# Patient Record
Sex: MALE | Race: WHITE | NOT HISPANIC OR LATINO | Employment: UNEMPLOYED | URBAN - METROPOLITAN AREA
[De-identification: names, ages, dates, MRNs, and addresses within clinical notes are randomized per-mention and may not be internally consistent; named-entity substitution may affect disease eponyms.]

---

## 2020-04-22 ENCOUNTER — HOSPITAL ENCOUNTER (EMERGENCY)
Dept: CARDIOLOGY | Facility: CLINIC | Age: 45
End: 2020-04-22
Attending: NURSE PRACTITIONER
Payer: COMMERCIAL

## 2020-04-22 ENCOUNTER — APPOINTMENT (OUTPATIENT)
Dept: GENERAL RADIOLOGY | Facility: CLINIC | Age: 45
End: 2020-04-22
Attending: NURSE PRACTITIONER
Payer: COMMERCIAL

## 2020-04-22 ENCOUNTER — HOSPITAL ENCOUNTER (EMERGENCY)
Facility: CLINIC | Age: 45
Discharge: HOME | End: 2020-04-22
Attending: NURSE PRACTITIONER | Admitting: NURSE PRACTITIONER
Payer: COMMERCIAL

## 2020-04-22 VITALS
WEIGHT: 150 LBS | HEART RATE: 68 BPM | BODY MASS INDEX: 20.32 KG/M2 | DIASTOLIC BLOOD PRESSURE: 80 MMHG | SYSTOLIC BLOOD PRESSURE: 150 MMHG | HEIGHT: 72 IN | OXYGEN SATURATION: 99 % | RESPIRATION RATE: 16 BRPM | TEMPERATURE: 98.2 F

## 2020-04-22 DIAGNOSIS — R00.2 PALPITATIONS: ICD-10-CM

## 2020-04-22 LAB
ALBUMIN SERPL-MCNC: 4.2 G/DL (ref 3.4–5)
ALP SERPL-CCNC: 79 U/L (ref 40–150)
ALT SERPL W P-5'-P-CCNC: 32 U/L (ref 0–70)
ANION GAP SERPL CALCULATED.3IONS-SCNC: 7 MMOL/L (ref 3–14)
AST SERPL W P-5'-P-CCNC: 22 U/L (ref 0–45)
BASOPHILS # BLD AUTO: 0 10E9/L (ref 0–0.2)
BASOPHILS NFR BLD AUTO: 0.8 %
BILIRUB SERPL-MCNC: 0.8 MG/DL (ref 0.2–1.3)
BUN SERPL-MCNC: 15 MG/DL (ref 7–30)
CALCIUM SERPL-MCNC: 9.2 MG/DL (ref 8.5–10.1)
CHLORIDE SERPL-SCNC: 106 MMOL/L (ref 94–109)
CO2 SERPL-SCNC: 26 MMOL/L (ref 20–32)
CREAT SERPL-MCNC: 0.99 MG/DL (ref 0.66–1.25)
DIFFERENTIAL METHOD BLD: NORMAL
EOSINOPHIL # BLD AUTO: 0.1 10E9/L (ref 0–0.7)
EOSINOPHIL NFR BLD AUTO: 1.4 %
ERYTHROCYTE [DISTWIDTH] IN BLOOD BY AUTOMATED COUNT: 12.9 % (ref 10–15)
GFR SERPL CREATININE-BSD FRML MDRD: >90 ML/MIN/{1.73_M2}
GLUCOSE SERPL-MCNC: 100 MG/DL (ref 70–99)
HCT VFR BLD AUTO: 47.1 % (ref 40–53)
HGB BLD-MCNC: 16.4 G/DL (ref 13.3–17.7)
IMM GRANULOCYTES # BLD: 0 10E9/L (ref 0–0.4)
IMM GRANULOCYTES NFR BLD: 0.2 %
INTERPRETATION ECG - MUSE: NORMAL
LIPASE SERPL-CCNC: 112 U/L (ref 73–393)
LYMPHOCYTES # BLD AUTO: 1.8 10E9/L (ref 0.8–5.3)
LYMPHOCYTES NFR BLD AUTO: 36 %
MAGNESIUM SERPL-MCNC: 2.2 MG/DL (ref 1.6–2.3)
MCH RBC QN AUTO: 32.4 PG (ref 26.5–33)
MCHC RBC AUTO-ENTMCNC: 34.8 G/DL (ref 31.5–36.5)
MCV RBC AUTO: 93 FL (ref 78–100)
MONOCYTES # BLD AUTO: 0.5 10E9/L (ref 0–1.3)
MONOCYTES NFR BLD AUTO: 10.8 %
NEUTROPHILS # BLD AUTO: 2.5 10E9/L (ref 1.6–8.3)
NEUTROPHILS NFR BLD AUTO: 50.8 %
NRBC # BLD AUTO: 0 10*3/UL
NRBC BLD AUTO-RTO: 0 /100
PLATELET # BLD AUTO: 275 10E9/L (ref 150–450)
POTASSIUM SERPL-SCNC: 3.3 MMOL/L (ref 3.4–5.3)
PROT SERPL-MCNC: 7.3 G/DL (ref 6.8–8.8)
RBC # BLD AUTO: 5.06 10E12/L (ref 4.4–5.9)
SODIUM SERPL-SCNC: 139 MMOL/L (ref 133–144)
TROPONIN I SERPL-MCNC: <0.015 UG/L (ref 0–0.04)
WBC # BLD AUTO: 4.9 10E9/L (ref 4–11)

## 2020-04-22 PROCEDURE — 93005 ELECTROCARDIOGRAM TRACING: CPT | Mod: 59

## 2020-04-22 PROCEDURE — 99285 EMERGENCY DEPT VISIT HI MDM: CPT | Mod: 25

## 2020-04-22 PROCEDURE — 71046 X-RAY EXAM CHEST 2 VIEWS: CPT

## 2020-04-22 PROCEDURE — 0296T ZIO PATCH HOLTER ADULT PEDIATRIC GREATER THAN 48 HRS: CPT

## 2020-04-22 PROCEDURE — 84484 ASSAY OF TROPONIN QUANT: CPT | Performed by: NURSE PRACTITIONER

## 2020-04-22 PROCEDURE — 25000132 ZZH RX MED GY IP 250 OP 250 PS 637: Performed by: NURSE PRACTITIONER

## 2020-04-22 PROCEDURE — 83735 ASSAY OF MAGNESIUM: CPT | Performed by: NURSE PRACTITIONER

## 2020-04-22 PROCEDURE — 0298T ZIO PATCH HOLTER ADULT PEDIATRIC GREATER THAN 48 HRS: CPT | Performed by: INTERNAL MEDICINE

## 2020-04-22 PROCEDURE — 83690 ASSAY OF LIPASE: CPT | Performed by: NURSE PRACTITIONER

## 2020-04-22 PROCEDURE — 80053 COMPREHEN METABOLIC PANEL: CPT | Performed by: NURSE PRACTITIONER

## 2020-04-22 PROCEDURE — 85025 COMPLETE CBC W/AUTO DIFF WBC: CPT | Performed by: NURSE PRACTITIONER

## 2020-04-22 RX ORDER — ASPIRIN 81 MG/1
324 TABLET, CHEWABLE ORAL ONCE
Status: COMPLETED | OUTPATIENT
Start: 2020-04-22 | End: 2020-04-22

## 2020-04-22 RX ADMIN — ASPIRIN 81 MG 324 MG: 81 TABLET ORAL at 11:35

## 2020-04-22 ASSESSMENT — ENCOUNTER SYMPTOMS
FEVER: 0
SHORTNESS OF BREATH: 1
PALPITATIONS: 1
APPETITE CHANGE: 0
ACTIVITY CHANGE: 0
COUGH: 0

## 2020-04-22 ASSESSMENT — MIFFLIN-ST. JEOR: SCORE: 1608.4

## 2020-04-22 NOTE — ED AVS SNAPSHOT
Emergency Department  [address] [place] [address]  Phone:  [phone]  Fax:  [phone]                                    Alejo Ramirez   MRN: [id number]    Department:   Emergency Department   Date of Visit:  4/22/2020           After Visit Summary Signature Page    I have received my discharge instructions, and my questions have been answered. I have discussed any challenges I see with this plan with the nurse or doctor.    ..........................................................................................................................................  Patient/Patient Representative Signature      ..........................................................................................................................................  Patient Representative Print Name and Relationship to Patient    ..................................................               ................................................  Date                                   Time    ..........................................................................................................................................  Reviewed by Signature/Title    ...................................................              ..............................................  Date                                               Time          22EPIC Rev 08/18

## 2020-04-22 NOTE — ED PROVIDER NOTES
History     Chief Complaint:  Palpitations    HPI  Alejo Ramirez is a 45 year old male who presents to the emergency department for evaluation of palpitations. The patient reports once an hour for the past 5 days having episodes where he feels his heart will skip a beat. The first episode today was at 0700 and he had a single instance of feeling short of breath when this happened but this has not reoccurred. In the past the patient has experienced this beat skipping feeling rarely but it has been consistently once an hour for these last few days. He denies any changes in his diet, exercise or medications prior to this. He denies any fever, cough, constant shortness of breath, or chest pain.     Allergies:  No known drug allergies    Medications:    The patient is not currently taking any prescribed medications.    Past Medical History:    The patient does not have any past pertinent medical history.    Past Surgical History:    History reviewed. No pertinent surgical history.    Family History:    History reviewed. No pertinent family history.     Social History:  The patient arrives alone  Smoking: never  Alcohol use: yes    Review of Systems   Constitutional: Negative for activity change, appetite change and fever.   Respiratory: Positive for shortness of breath. Negative for cough.    Cardiovascular: Positive for palpitations. Negative for chest pain.   All other systems reviewed and are negative.    Physical Exam     Patient Vitals for the past 24 hrs:   BP Temp Temp src Pulse Heart Rate Resp SpO2 Height Weight   04/22/20 1215 (!) 150/80 -- -- -- -- 16 99 % -- --   04/22/20 1136 (!) 148/78 -- -- -- 56 9 98 % -- --   04/22/20 1130 -- -- -- -- 54 11 99 % -- --   04/22/20 1111 (!) 152/82 98.2  F (36.8  C) Oral 68 -- 18 97 % 1.829 m (6') 68 kg (150 lb)     Physical Exam  Nursing notes reviewed. Vitals reviewed.  General: Alert. Well kept.  Eyes:  Conjunctiva non-injected, non-icteric.  Neck/Throat: Moist mucous  membranes.  Normal voice.  Cardiac: Regular rhythm. Normal heart sounds with no murmur/rubs/click. No peripheral edema.   Pulmonary: Clear and equal breath sounds bilaterally. No crackles/rales. No wheezing  Abdomen: Soft. Non-distended. Non-tender to palpation. No masses. No guarding or rebound.  Musculoskeletal: Normal gross range of motion of all 4 extremities.    Neurological: Alert and oriented x4.   Skin: Warm and dry without rashes or petechiae. Normal appearance of visualized exposed skin.  Psych: Affect normal. Good eye contact.    Emergency Department Course     ECG:  ECG taken at 1114, ECG read at 1121  Sinus bradycardia  Otherwise normal ECG  Rate 59 bpm. HI interval 132 ms. QRS duration 92 ms. QT/QTc 422/417 ms. P-R-T axes 75 81 71.    Imaging:  Radiology findings were communicated with the patient who voiced understanding of the findings.    XR Chest 2 views   IMPRESSION: Negative chest  Reading per radiology    Laboratory:  Laboratory findings were communicated with the patient who voiced understanding of the findings.    CBC:  o/w WNL. (WBC 4.9, HGB 16.4, )   CMP: Glucose 100 (H), potassium 3.3 (L), o/w WNL (Creatinine: 0.99)    Lipase: 112    Troponin (Collected 1132): <0.015    Magnesium: 2.2    Interventions:  1135 Aspirin 325mg PO    Emergency Department Course:  Past medical records, nursing notes, and vitals reviewed.  1121: I performed an exam of the patient and obtained history, as documented above.     IV was inserted and blood was drawn for laboratory testing, results above.  The patient was sent for an XR while in the emergency department, findings above    1201: I rechecked the patient. Explained findings to patient.    I personally reviewed the laboratory and imaging results with the Patient and answered all related questions prior to discharge.     Findings and plan explained to the Patient. Patient discharged home with instructions regarding supportive care, medications, and  reasons to return. The importance of close follow-up was reviewed.   Impression & Plan      Medical Decision Making:  Alejo Ramirez is a 45 year old male who presents for evaluation of palpitations.  Initial ECG shows sinus bradycardia. The work up in the Emergency Department is negative , monitoring and EKG have not shown any abnormal heart rhythms or concerning morphologies.  I considered a broad differential diagnosis including acute coronary syndrome, myocardial infarction, pulmonary embolism, electrolyte abnormalities, drug reaction, thyroid disease, caffeine or other stimulant, anxiety, amongst others.  Electrolytes are normal and the patient is not anemic. No EKG changes or troponin elevation concerning for acute coronary syndrome.  The patient is PERC negative and PE considered unlikely. A ZioPatch monitor was ordered and he will follow-up with his primary care to discuss results of the Ziopatch.   No serious etiology for the palpitations were detected today during this visit and I feel the patient is safe for discharge.   Close follow up with primary care is indicated, this was made clear to the patient, who understands.     Diagnosis:    ICD-10-CM    1. Palpitations  R00.2 Zio Patch Holter Adult Pediatric Greater than 48 hrs       Disposition:   discharged to home      Scribe Disclosure:  Jessica CASTRO, am serving as a scribe at 11:21 AM on 4/22/2020 to document services personally performed by Nohelia Castorena DNP based on my observations and the provider's statements to me.     EMERGENCY DEPARTMENT       Nohelia Castorena CNP  04/22/20 1192

## 2024-05-16 ENCOUNTER — APPOINTMENT (OUTPATIENT)
Dept: GENERAL RADIOLOGY | Facility: CLINIC | Age: 49
End: 2024-05-16
Attending: EMERGENCY MEDICINE
Payer: COMMERCIAL

## 2024-05-16 ENCOUNTER — HOSPITAL ENCOUNTER (EMERGENCY)
Facility: CLINIC | Age: 49
Discharge: HOME | End: 2024-05-16
Attending: EMERGENCY MEDICINE | Admitting: EMERGENCY MEDICINE
Payer: COMMERCIAL

## 2024-05-16 VITALS
BODY MASS INDEX: 21.4 KG/M2 | OXYGEN SATURATION: 98 % | TEMPERATURE: 97.7 F | HEART RATE: 54 BPM | DIASTOLIC BLOOD PRESSURE: 82 MMHG | SYSTOLIC BLOOD PRESSURE: 129 MMHG | RESPIRATION RATE: 8 BRPM | HEIGHT: 72 IN | WEIGHT: 158 LBS

## 2024-05-16 DIAGNOSIS — R07.9 CHEST PAIN, UNSPECIFIED TYPE: ICD-10-CM

## 2024-05-16 DIAGNOSIS — R91.1 PULMONARY NODULE: ICD-10-CM

## 2024-05-16 LAB
ANION GAP SERPL CALCULATED.3IONS-SCNC: 11 MMOL/L (ref 7–15)
ATRIAL RATE - MUSE: 55 BPM
BASOPHILS # BLD AUTO: 0.1 10E3/UL (ref 0–0.2)
BASOPHILS NFR BLD AUTO: 1 %
BUN SERPL-MCNC: 15.2 MG/DL (ref 6–20)
CALCIUM SERPL-MCNC: 8.6 MG/DL (ref 8.6–10)
CHLORIDE SERPL-SCNC: 104 MMOL/L (ref 98–107)
CREAT SERPL-MCNC: 1.02 MG/DL (ref 0.67–1.17)
DEPRECATED HCO3 PLAS-SCNC: 26 MMOL/L (ref 22–29)
DIASTOLIC BLOOD PRESSURE - MUSE: NORMAL MMHG
EGFRCR SERPLBLD CKD-EPI 2021: >90 ML/MIN/1.73M2
EOSINOPHIL # BLD AUTO: 0.1 10E3/UL (ref 0–0.7)
EOSINOPHIL NFR BLD AUTO: 1 %
ERYTHROCYTE [DISTWIDTH] IN BLOOD BY AUTOMATED COUNT: 12.9 % (ref 10–15)
GLUCOSE SERPL-MCNC: 126 MG/DL (ref 70–99)
HCT VFR BLD AUTO: 45.5 % (ref 40–53)
HGB BLD-MCNC: 15.7 G/DL (ref 13.3–17.7)
HOLD SPECIMEN: NORMAL
HOLD SPECIMEN: NORMAL
IMM GRANULOCYTES # BLD: 0 10E3/UL
IMM GRANULOCYTES NFR BLD: 0 %
INTERPRETATION ECG - MUSE: NORMAL
LYMPHOCYTES # BLD AUTO: 1.7 10E3/UL (ref 0.8–5.3)
LYMPHOCYTES NFR BLD AUTO: 27 %
MCH RBC QN AUTO: 33 PG (ref 26.5–33)
MCHC RBC AUTO-ENTMCNC: 34.5 G/DL (ref 31.5–36.5)
MCV RBC AUTO: 96 FL (ref 78–100)
MONOCYTES # BLD AUTO: 0.6 10E3/UL (ref 0–1.3)
MONOCYTES NFR BLD AUTO: 9 %
NEUTROPHILS # BLD AUTO: 3.9 10E3/UL (ref 1.6–8.3)
NEUTROPHILS NFR BLD AUTO: 62 %
NRBC # BLD AUTO: 0 10E3/UL
NRBC BLD AUTO-RTO: 0 /100
P AXIS - MUSE: 75 DEGREES
PLATELET # BLD AUTO: 290 10E3/UL (ref 150–450)
POTASSIUM SERPL-SCNC: 3.7 MMOL/L (ref 3.4–5.3)
PR INTERVAL - MUSE: 142 MS
QRS DURATION - MUSE: 94 MS
QT - MUSE: 418 MS
QTC - MUSE: 399 MS
R AXIS - MUSE: 75 DEGREES
RBC # BLD AUTO: 4.76 10E6/UL (ref 4.4–5.9)
SODIUM SERPL-SCNC: 141 MMOL/L (ref 135–145)
SYSTOLIC BLOOD PRESSURE - MUSE: NORMAL MMHG
T AXIS - MUSE: 73 DEGREES
TROPONIN T SERPL HS-MCNC: <6 NG/L
TROPONIN T SERPL HS-MCNC: <6 NG/L
VENTRICULAR RATE- MUSE: 55 BPM
WBC # BLD AUTO: 6.3 10E3/UL (ref 4–11)

## 2024-05-16 PROCEDURE — 84484 ASSAY OF TROPONIN QUANT: CPT | Performed by: EMERGENCY MEDICINE

## 2024-05-16 PROCEDURE — 71046 X-RAY EXAM CHEST 2 VIEWS: CPT

## 2024-05-16 PROCEDURE — 36415 COLL VENOUS BLD VENIPUNCTURE: CPT | Performed by: STUDENT IN AN ORGANIZED HEALTH CARE EDUCATION/TRAINING PROGRAM

## 2024-05-16 PROCEDURE — 85004 AUTOMATED DIFF WBC COUNT: CPT | Performed by: STUDENT IN AN ORGANIZED HEALTH CARE EDUCATION/TRAINING PROGRAM

## 2024-05-16 PROCEDURE — 80048 BASIC METABOLIC PNL TOTAL CA: CPT | Performed by: EMERGENCY MEDICINE

## 2024-05-16 PROCEDURE — 85025 COMPLETE CBC W/AUTO DIFF WBC: CPT | Performed by: EMERGENCY MEDICINE

## 2024-05-16 PROCEDURE — 93005 ELECTROCARDIOGRAM TRACING: CPT

## 2024-05-16 PROCEDURE — 99285 EMERGENCY DEPT VISIT HI MDM: CPT | Mod: 25

## 2024-05-16 PROCEDURE — 36415 COLL VENOUS BLD VENIPUNCTURE: CPT | Performed by: EMERGENCY MEDICINE

## 2024-05-16 ASSESSMENT — ACTIVITIES OF DAILY LIVING (ADL)
ADLS_ACUITY_SCORE: 35

## 2024-05-16 ASSESSMENT — COLUMBIA-SUICIDE SEVERITY RATING SCALE - C-SSRS
6. HAVE YOU EVER DONE ANYTHING, STARTED TO DO ANYTHING, OR PREPARED TO DO ANYTHING TO END YOUR LIFE?: NO
1. IN THE PAST MONTH, HAVE YOU WISHED YOU WERE DEAD OR WISHED YOU COULD GO TO SLEEP AND NOT WAKE UP?: NO
2. HAVE YOU ACTUALLY HAD ANY THOUGHTS OF KILLING YOURSELF IN THE PAST MONTH?: NO

## 2024-05-17 NOTE — ED NOTES
Discharge to home, review of discharge paperwork, Primary follow-up and all medications have been competed.  Patient and/or family questions gave been answered.

## 2024-05-17 NOTE — ED TRIAGE NOTES
Patient has been having chest discomfort for a couple of weeks, and has been seen and cleared at [facility] last week.  Today he had an episode of increase sharp pain on the left anterior chest that has since resolved.  Denies sob.     Triage Assessment (Adult)       Row Name 05/16/24 1912          Triage Assessment    Airway WDL WDL        Respiratory WDL    Respiratory WDL WDL        Skin Circulation/Temperature WDL    Skin Circulation/Temperature WDL WDL        Cardiac WDL    Cardiac WDL X;chest pain        Peripheral/Neurovascular WDL    Peripheral Neurovascular WDL WDL        Cognitive/Neuro/Behavioral WDL    Cognitive/Neuro/Behavioral WDL WDL

## 2024-05-17 NOTE — ED PROVIDER NOTES
"  Emergency Department Note      History of Present Illness     Chief Complaint  Chest Pain    HPI  Alejo Ramirez is a 49 year old male who presents to the ED for evaluation of chest pain. The patient reports that he was sitting his chair watching television tonight when his midsternum chest felt like he was \"electrically jolted\" for a two second long episode and developed lightheadedness afterward. The patient notes ongoing episodes of midsternum chest pain that last for hours and typically onset when he is at rest. He endorses being active 3-4 times a week with no issue and actually states his symptoms improve when he is exerting himself. Notably, the patient saw a primary care doctor 05/09/2024 regarding this ongoing chest pain and upper left quadrant abdominal pain. The patient denotes any fever, cough, shortness of breath, chest pain, or palpitations.The patient endorses no known heart disease in himself or relatives, but does not that both his mother and brother have hisotries of heart murmurs.          HEART Score  Background  Calculates the overall risk of adverse event in patient's presenting with chest pain.  Based on 5 criteria (each assigned 0-2 points) including suspiciousness of history, EKG, age, risk factors and troponin.    Data  49 year old male  does not have a problem list on file.   reports that he has never smoked. He does not have any smokeless tobacco history on file.  family history is not on file.  Lab Results   Component Value Date    TROPI <0.015 04/22/2020     Criteria   0-2 points for each of 5 items (maximum of 10 points):  Score 0- History slightly suspicious for coronary syndrome  Score 0- EKG Normal  Score 1- Age 45 to 65 years old  Score 0- No risk factors for atherosclerotic disease  Score 0- Within normal limits for troponin levels  Interpretation  Risk of adverse outcome  Heart Score: 1  Total Score 0-3- Adverse Outcome Risk 2.5% - Supports early discharge with appropriate " follow-up    Independent Historian  None    Review of External Notes  I reviewed the office visit on 05/09/2024 with Dr. Carlton for chest pain. Her report noted a normal TSH, elevated cholesterol, normal metabolic panel, unremarkable EKG, and chest pain due to GI symptoms so he was started on prilosec.         Past Medical History   Medical History and Problem List  Migraine    Medications  Prilosec     Surgical History   No pertinent surgical history.     Physical Exam   Patient Vitals for the past 24 hrs:   BP Temp Temp src Pulse Resp SpO2 Height Weight   05/16/24 2215 129/82 -- -- 54 (!) 8 98 % -- --   05/16/24 2200 125/85 -- -- 61 11 97 % -- --   05/16/24 2145 124/84 -- -- 63 -- 98 % -- --   05/16/24 2100 130/85 -- -- 60 13 96 % -- --   05/16/24 2045 127/89 -- -- 57 (!) 7 97 % -- --   05/16/24 2030 128/82 -- -- 60 10 96 % -- --   05/16/24 2015 130/81 -- -- 58 12 96 % -- --   05/16/24 2000 (!) 151/91 -- -- 68 (!) 31 99 % -- --   05/16/24 1916 (!) 147/84 -- -- -- -- -- -- --   05/16/24 1911 -- 97.7  F (36.5  C) Temporal 63 20 99 % 1.829 m (6') 71.7 kg (158 lb)     Physical Exam    Physical Exam   Constitutional:  Patient is oriented to person, place, and time. They appear well-developed and well-nourished.     HENT:   Mouth/Throat:   Oropharynx is clear and moist.   Eyes:    Conjunctivae normal and EOM are normal. Pupils are equal, round, and reactive to light.   Neck:    Normal range of motion.   Cardiovascular: Normal rate, regular rhythm and normal heart sounds.  Exam reveals no gallop and no friction rub.  No murmur heard.  Pulmonary/Chest:  Effort normal and breath sounds normal. Patient has no wheezes. Patient has no rales.   Abdominal:   Soft. Bowel sounds are normal. Patient exhibits no mass. There is no tenderness. There is no rebound and no guarding.   Musculoskeletal:  Normal range of motion. Patient exhibits no edema.   Neurological:   Patient is alert and oriented to person, place, and time. Patient  has normal strength. No cranial nerve deficit or sensory deficit. GCS 15  Skin:   Skin is warm and dry. No rash noted. No erythema.   Psychiatric:   Patient has a normal mood and affect. Patient's behavior is normal. Judgment and thought content normal.    Diagnostics   Lab Results   Labs Ordered and Resulted from Time of ED Arrival to Time of ED Departure   BASIC METABOLIC PANEL - Abnormal       Result Value    Sodium 141      Potassium 3.7      Chloride 104      Carbon Dioxide (CO2) 26      Anion Gap 11      Urea Nitrogen 15.2      Creatinine 1.02      GFR Estimate >90      Calcium 8.6      Glucose 126 (*)    TROPONIN T, HIGH SENSITIVITY - Normal    Troponin T, High Sensitivity <6     CBC WITH PLATELETS AND DIFFERENTIAL    WBC Count 6.3      RBC Count 4.76      Hemoglobin 15.7      Hematocrit 45.5      MCV 96      MCH 33.0      MCHC 34.5      RDW 12.9      Platelet Count 290      % Neutrophils 62      % Lymphocytes 27      % Monocytes 9      % Eosinophils 1      % Basophils 1      % Immature Granulocytes 0      NRBCs per 100 WBC 0      Absolute Neutrophils 3.9      Absolute Lymphocytes 1.7      Absolute Monocytes 0.6      Absolute Eosinophils 0.1      Absolute Basophils 0.1      Absolute Immature Granulocytes 0.0      Absolute NRBCs 0.0     TROPONIN T, HIGH SENSITIVITY       Imaging  XR Chest 2 Views   Final Result   IMPRESSION: Cardiac silhouette size is within normal limits. New, 5 mm nodular opacity at the lateral aspect of the left midlung. This could be inflammatory in nature. Recommend follow-up chest x-ray in 4-6 weeks 2 reassess this. Lungs are otherwise    clear.      Exercise Stress Echocardiogram    (Results Pending)       EKG   ECG taken at 1917, ECG read at 1921  Sinus Bradycardia    No change as compared to prior, dated 04/22/2020.  Rate 55 bpm. ND interval 142 ms. QRS duration 94 ms. QT/QTc 418/399 ms. P-R-T axes 75 75 73.    Independent Interpretation  None  ED Course    Medications  Administered  Medications - No data to display    Procedures  Procedures     Discussion of Management  None    Social Determinants of Health adding to complexity of care  None    ED Course  ED Course as of 05/16/24 2233   Thu May 16, 2024   2031 I obtained history and examined the patient as noted above.    2215 I rechecked and updated the patient. We discussed plans for discharge and the patient is comfortable with this plan.      Medical Decision Making / Diagnosis   CMS Diagnoses: None    MIPS     None    [facility]  Alejo Ramirez is a 49 year old male who presents to the emergency department with nausea very atypical chest pain.  EKG shows sinus rhythm with no ischemia.  Cardiac enzyme is undetectable.  He is not acutely anemic he has no metabolic derangement.  Chest x-ray was performed which does show a nodule on the left lung which he was made aware of he should get this rechecked in 4 to 6 weeks through his primary care doctor no real concern for pneumonia or infection.  At this time I think given his low risk for cardiac disease the atypical nature of this pain that this is unlikely to be cardiac.  I will refer him to cardiology he had a Zio patch done a couple years ago that maybe showed 4 beats of V. tach but never followed up.  A stress echo will be ordered and he will follow-up with his primary care doctor afterwards.  In the meantime he will refrain from doing his home workouts until he gets his results from his stress test.    Disposition  The patient was discharged.     ICD-10 Codes:    ICD-10-CM    1. Chest pain, unspecified type  R07.9 Follow-Up with Cardiology     Exercise Stress Echocardiogram      2. Pulmonary nodule  R91.1            Discharge Medications  New Prescriptions    No medications on file         Scribe Disclosure:  I, Dorita Randhawa, am serving as a scribe at 9:02 PM on 5/16/2024 to document services personally performed by Azul Carrillo MD based on my observations and the  provider's statements to me.     Scribe Disclosure:  I, Shay Fartun, am serving as a scribe at 9:17 PM on 5/16/2024 to document services personally performed by Azul Carrillo MD based on my observations and the provider's statements to me.        Azul Carrillo MD  05/17/24 0036

## 2024-05-17 NOTE — DISCHARGE INSTRUCTIONS
Follow-up with cardiology in 1 week for a stress test.  Refrain from doing your home workouts and to receive results.  There is a pulmonary nodule on your chest x-ray it is recommended you get a repeat chest x-ray in 4 to 6 weeks to ensure resolution this may be inflammatory.  Return to the emergency department if you have any new, worsening, or persistent chest pain.

## 2024-05-23 ENCOUNTER — HOSPITAL ENCOUNTER (OUTPATIENT)
Dept: CARDIOLOGY | Facility: CLINIC | Age: 49
Discharge: HOME | End: 2024-05-23
Attending: EMERGENCY MEDICINE | Admitting: EMERGENCY MEDICINE
Payer: COMMERCIAL

## 2024-05-23 DIAGNOSIS — R07.9 CHEST PAIN, UNSPECIFIED TYPE: ICD-10-CM

## 2024-05-23 PROCEDURE — 93350 STRESS TTE ONLY: CPT | Mod: 26 | Performed by: INTERNAL MEDICINE

## 2024-05-23 PROCEDURE — 93016 CV STRESS TEST SUPVJ ONLY: CPT | Performed by: INTERNAL MEDICINE

## 2024-05-23 PROCEDURE — 93325 DOPPLER ECHO COLOR FLOW MAPG: CPT | Mod: TC

## 2024-05-23 PROCEDURE — 93350 STRESS TTE ONLY: CPT | Mod: TC

## 2024-05-23 PROCEDURE — 93321 DOPPLER ECHO F-UP/LMTD STD: CPT | Mod: 26 | Performed by: INTERNAL MEDICINE

## 2024-05-23 PROCEDURE — 93325 DOPPLER ECHO COLOR FLOW MAPG: CPT | Mod: 26 | Performed by: INTERNAL MEDICINE

## 2024-05-23 PROCEDURE — 93018 CV STRESS TEST I&R ONLY: CPT | Performed by: INTERNAL MEDICINE

## 2024-07-14 ENCOUNTER — HEALTH MAINTENANCE LETTER (OUTPATIENT)
Age: 49
End: 2024-07-14

## 2025-07-19 ENCOUNTER — HEALTH MAINTENANCE LETTER (OUTPATIENT)
Age: 50
End: 2025-07-19